# Patient Record
(demographics unavailable — no encounter records)

---

## 2025-04-09 NOTE — PHYSICAL EXAM

## 2025-04-09 NOTE — ASSESSMENT
[FreeTextEntry1] : Risks/benefits of EGD discussed at length with pt. Pt expressed understanding, but declines to schedule EGD at this time. Referred pt for X-Ray Esophagram, will f/u with results. Instructed pt to continue taking Pantoprazole. Pt expressed understanding and agrees with the plan.  A low acid/reflux diet was discussed in great detail including not smoking, not drinking alcohol, and not consuming foods that irritate the esophagus. It is helpful to eat small meals throughout the day instead of large meals. You should avoid eating before bedtime or lying down after you eat. It can be helpful to raise the head of your bed six inches. Additionally, you should maintain a healthy weight and good posture. The patient was given written material to take home and review.  I spent 35 minutes reviewing the patient's records prior to arrival, with the patient, and reviewing records after the visit. All prior testing reviewed at length. Patient verbalized understanding of all information provided. All questions answered and reviewed.  Robert Brunner, MD

## 2025-04-09 NOTE — HISTORY OF PRESENT ILLNESS
[FreeTextEntry1] : Patient is a 61 y/o female with a PMHx of Lt sided carcinoid bronchial adenoma, Melanoma, Morbid Obesity s/p gastric sleeve gastrectomy, thyroid nodule, Iron Deficiency Anemia, GERD with Esophagitis and hemorrhage, Gastritis, and Grade II Internal Hemorrhoids, currently on Pantoprazole 40 mg once daily, who presents c/o food regurgitation x several months. Last EGD-Colonoscopy was 6/20/2023, EGD showed Grade III esophagitis with hemorrhage, 5 cm esophageal hiatal hernia, gastritis, and was negative for H. Pylori. Colonoscopy was unremarkable except for Grade II Internal Hemorrhoids. Last Capsule Endoscopy was 11/1/2023, which was unremarkable.

## 2025-04-10 NOTE — ASSESSMENT
[FreeTextEntry1] : will observe. to return earlier if any change. bloods and sonogram next visit.  patient has been given the opportunity to ask questions, and all of the patient's questions have been answered to their satisfaction

## 2025-04-10 NOTE — PHYSICAL EXAM
[de-identified] : no palpable thyroid nodules [Laryngoscopy Performed] : laryngoscopy was performed, see procedure section for findings [Midline] : located in midline position [Normal] : orientation to person, place, and time: normal

## 2025-04-10 NOTE — HISTORY OF PRESENT ILLNESS
[de-identified] : prior evaluation of thyroid nodules. below threshold for biopsy. no further dysphagia.  had resection lung carcinoid. being worked up for hiatal hernia.  recent TFTs and calcium normal. recent sonogram stable. I have reviewed all old and new data and available images.  Additional information was obtained from others present at the time of visit to ensure the completeness of the history

## 2025-06-03 NOTE — CONSULT LETTER
[Dear  ___] : Dear  [unfilled], [Consult Letter:] : I had the pleasure of evaluating your patient, [unfilled]. [Please see my note below.] : Please see my note below. [Consult Closing:] : Thank you very much for allowing me to participate in the care of this patient.  If you have any questions, please do not hesitate to contact me. [Sincerely,] : Sincerely, [FreeTextEntry2] : Dr. Jayla Flores (Ref) [FreeTextEntry3] : Virgniia Hoyt MD Attending Surgeon Division of Thoracic Surgery , [facility] [employer] [facility]

## 2025-06-03 NOTE — ASSESSMENT
[FreeTextEntry1] : Ms. KIM CROWDER, 60 year old female, never smoker, w/ hx of HLD, osteoarthritis, ROBBY, eczema, melanoma, basal cell carcinoma, who presented with increasing in size lung nodule.  Now s/p R.A., Lt VATS, LLL wedge rxn, MLND on 11/9/23. Path revealed LLL typical carcinoid/neuroendocrine tumor, 6 mm, G1, all margins and LNs are negative, [id number]. - Additional finding shoed tumorlets.   I have reviewed the patient's medical records and diagnostic images at time of this office consultation and have made the following recommendation: 1. CT scan showed no evidence of recurrence, discussed the risk of recurrence of the disease and stressed the importance of routine CT scan surveillance, Pt verbalized understanding. Recommended patient to return to office in 6 mons with CT Chest w/o contrast.   I, Dr. NEAL, [facility], personally performed the evaluation and management (E/M) services for this established patient who presents today with (a) new problem(s)/exacerbation of (an) existing condition(s).  That E/M includes conducting the examination, assessing all new/exacerbated conditions, and establishing a new plan of care.  Today, my ACP, LINDA Wilder-BC was here to observe my evaluation and management services for this new problem/exacerbated condition to be followed going forward.

## 2025-06-03 NOTE — CONSULT LETTER
[Dear  ___] : Dear  [unfilled], [Consult Letter:] : I had the pleasure of evaluating your patient, [unfilled]. [Please see my note below.] : Please see my note below. [Consult Closing:] : Thank you very much for allowing me to participate in the care of this patient.  If you have any questions, please do not hesitate to contact me. [Sincerely,] : Sincerely, [FreeTextEntry2] : Dr. Jayla Flores (Ref) [FreeTextEntry3] : Virginia Hoyt MD Attending Surgeon Division of Thoracic Surgery , [facility] [employer] at [facility]

## 2025-06-03 NOTE — HISTORY OF PRESENT ILLNESS
[FreeTextEntry1] : Ms. KIM CROWDER, 60 year old female, never smoker, w/ hx of HLD, osteoarthritis, ROBBY, eczema, melanoma, basal cell carcinoma, who presented with increasing in size lung nodule.  CTA chest 3/20/23: - No pulmonary arterial emboli - 7 mm LLL pulmonary nodule. A 3-6 month f/u noncontrast chest CT is recommended to ensure stability.  PFTs on 6/7/23: FVC 95%, FEV1 106%, DLCO 90%.  CT chest w/o contrast 10/9/23: - 10 mm nodule in the LLL (8: 205) - 2 mm solid nodule in the RML (image 126) - 3 mm nodule in the RLL (image 171)  PET/CT 10/18/23: - A 1.0 x 0.6 cm LLL nodule shows no increased FDG uptake above background. Recommend continued follow-up on CT. Note that not all lung neoplasms are FDG avid (e.g., carcinoid).  Now s/p R.A., Lt VATS, LLL wedge rxn, MLND on 11/9/23. Path revealed LLL typical carcinoid/neuroendocrine tumor, 6 mm, G1, all margins and LNs are negative, [id number]. - Additional finding shoed tumorlets  CT Chest on 2/08/2024: - status post left lower lobe wedge resection with expected postsurgical  - small hiatal hernia  - sleeve gastrectomy - no new or enlarging nodule  CT chest on 09/11/2024:  - post-op changes - CLARE  Last seen in 09/2024 with recommendation to RTC in 6 months with repeat CT Chest without contrast. No follow up since.   CT Chest on 5/22/25: - post-op changes - few micronodules are present   Patient is here today for a follow up. Pt reports doing well, denies SOB, cough or CP.

## 2025-06-03 NOTE — ASSESSMENT
[FreeTextEntry1] : Ms. KIM CROWDER, 60 year old female, never smoker, w/ hx of HLD, osteoarthritis, ROBBY, eczema, melanoma, basal cell carcinoma, who presented with increasing in size lung nodule.  Now s/p R.A., Lt VATS, LLL wedge rxn, MLND on 11/9/23. Path revealed LLL typical carcinoid/neuroendocrine tumor, 6 mm, G1, all margins and LNs are negative, [id number]. - Additional finding shoed tumorlets.   I have reviewed the patient's medical records and diagnostic images at time of this office consultation and have made the following recommendation: 1. CT scan showed no evidence of recurrence, discussed the risk of recurrence of the disease and stressed the importance of routine CT scan surveillance, Pt verbalized understanding. Recommended patient to return to office in 6 mons with CT Chest w/o contrast.   I, Dr. NEAL, [facility], personally performed the evaluation and management (E/M) services for this established patient who presents today with (a) new problem(s)/exacerbation of (an) existing condition(s).  That E/M includes conducting the examination, assessing all new/exacerbated conditions, and establishing a new plan of care.  Today, my ACP, LINDA Wilder-BC was here to observe my evaluation and management services for this new problem/exacerbated condition to be followed going forward.

## 2025-06-03 NOTE — HISTORY OF PRESENT ILLNESS
[FreeTextEntry1] : Ms. KIM CROWDER, 60 year old female, never smoker, w/ hx of HLD, osteoarthritis, ROBBY, eczema, melanoma, basal cell carcinoma, who presented with increasing in size lung nodule.  CTA chest 3/20/23: - No pulmonary arterial emboli - 7 mm LLL pulmonary nodule. A 3-6 month f/u noncontrast chest CT is recommended to ensure stability.  PFTs on 6/7/23: FVC 95%, FEV1 106%, DLCO 90%.  CT chest w/o contrast 10/9/23: - 10 mm nodule in the LLL (8: 205) - 2 mm solid nodule in the RML (image 126) - 3 mm nodule in the RLL (image 171)  PET/CT 10/18/23: - A 1.0 x 0.6 cm LLL nodule shows no increased FDG uptake above background. Recommend continued follow-up on CT. Note that not all lung neoplasms are FDG avid (e.g., carcinoid).  Now s/p R.A., Lt VATS, LLL wedge rxn, MLND on 11/9/23. Path revealed LLL typical carcinoid/neuroendocrine tumor, 6 mm, G1, all margins and LNs are negative, [id number]. - Additional finding shoed tumorlets  CT Chest on 2/08/2024: - status post left lower lobe wedge resection with expected postsurgical  - small hiatal hernia  - sleeve gastrectomy - no new or enlarging nodule  CT chest on 09/11/2024:  - post-op changes - CLARE  Last seen in 09/2024 with recommendation to RTC in 6 months with repeat CT Chest without contrast. No follow up since.   CT Chest on 5/22/25: - post-op changes - few micronodules are present   Patient is here today for a follow up. Pt reports doing well, denies SOB, cough or CP.

## 2025-07-23 NOTE — ASSESSMENT
[FreeTextEntry1] : Will refer to further f/u with advanced esophageal testing  Pt is advised to fu in office after testing is done  Pt was advised to continue taking Pantoprazole   A low acid/reflux diet was discussed in great detail including not smoking, not drinking alcohol, and not consuming foods that irritate the esophagus. It is helpful to eat small meals throughout the day instead of large meals. You should avoid eating before bedtime or lying down after you eat. It can be helpful to raise the head of your bed six inches. Additionally, you should maintain a healthy weight and good posture. The patient was given written material to take home and review.  I spent 35 minutes reviewing the patient's records prior to arrival, with the patient, and reviewing records after the visit. All prior testing reviewed at length. Patient verbalized understanding of all information provided. All questions answered and reviewed.  Robert Brunner, MD

## 2025-07-23 NOTE — PHYSICAL EXAM
[Alert] : alert [Normal Voice/Communication] : normal voice/communication [Healthy Appearing] : healthy appearing [No Acute Distress] : no acute distress [Sclera] : the sclera and conjunctiva were normal [Hearing Threshold Finger Rub Not [place]] : hearing was normal [Normal Lips/Gums] : the lips and gums were normal [Oropharynx] : the oropharynx was normal [Normal Appearance] : the appearance of the neck was normal [No Neck Mass] : no neck mass was observed [No Respiratory Distress] : no respiratory distress [No Acc Muscle Use] : no accessory muscle use [Respiration, Rhythm And Depth] : normal respiratory rhythm and effort [Auscultation Breath Sounds / Voice Sounds] : lungs were clear to auscultation bilaterally [Heart Rate And Rhythm] : heart rate was normal and rhythm regular [Normal S1, S2] : normal S1 and S2 [Murmurs] : no murmurs [Bowel Sounds] : normal bowel sounds [Abdomen Tenderness] : non-tender [No Masses] : no abdominal mass palpated [Abdomen Soft] : soft [] : no hepatosplenomegaly [Oriented To Time, Place, And Person] : oriented to person, place, and time

## 2025-07-23 NOTE — HISTORY OF PRESENT ILLNESS
[FreeTextEntry1] : 4/9/25 Patient is a 61 y/o female with a PMHx of Lt sided carcinoid bronchial adenoma, Melanoma, Morbid Obesity s/p gastric sleeve gastrectomy, thyroid nodule, Iron Deficiency Anemia, GERD with Esophagitis and hemorrhage, Gastritis, and Grade II Internal Hemorrhoids, currently on Pantoprazole 40 mg once daily, who presents c/o food regurgitation x several months. Last EGD-Colonoscopy was 6/20/2023, EGD showed Grade III esophagitis with hemorrhage, 5 cm esophageal hiatal hernia, gastritis, and was negative for H. Pylori. Colonoscopy was unremarkable except for Grade II Internal Hemorrhoids. Last Capsule Endoscopy was 11/1/2023, which was unremarkable.  7/23/25 Pt still c/o food regurgitation. She states while she is experiencing these symptoms she stops eating and just continues to drink instead. She had a Esophagram Xray on 6/12/25 which showed mild esophageal hiatal hernia and dysmotility.